# Patient Record
Sex: MALE | Race: WHITE | URBAN - METROPOLITAN AREA
[De-identification: names, ages, dates, MRNs, and addresses within clinical notes are randomized per-mention and may not be internally consistent; named-entity substitution may affect disease eponyms.]

---

## 2020-07-09 ENCOUNTER — APPOINTMENT (OUTPATIENT)
Dept: CT IMAGING | Age: 55
DRG: 066 | End: 2020-07-09
Payer: COMMERCIAL

## 2020-07-09 ENCOUNTER — HOSPITAL ENCOUNTER (OUTPATIENT)
Age: 55
Setting detail: OBSERVATION
Discharge: HOME | DRG: 066 | End: 2020-07-11
Attending: EMERGENCY MEDICINE | Admitting: INTERNAL MEDICINE
Payer: COMMERCIAL

## 2020-07-09 PROBLEM — H53.8 BLURRED VISION: Status: ACTIVE | Noted: 2020-07-09

## 2020-07-09 LAB
ALBUMIN SERPL-MCNC: 5.1 G/DL (ref 3.5–5.2)
ALP BLD-CCNC: 55 U/L (ref 40–129)
ALT SERPL-CCNC: 182 U/L (ref 0–40)
ANION GAP SERPL CALCULATED.3IONS-SCNC: 12 MMOL/L (ref 7–16)
APTT: 31.6 SEC (ref 24.5–35.1)
AST SERPL-CCNC: 106 U/L (ref 0–39)
BASOPHILS ABSOLUTE: 0.02 E9/L (ref 0–0.2)
BASOPHILS RELATIVE PERCENT: 0.4 % (ref 0–2)
BILIRUB SERPL-MCNC: 1.8 MG/DL (ref 0–1.2)
BUN BLDV-MCNC: 12 MG/DL (ref 6–20)
CALCIUM SERPL-MCNC: 9.7 MG/DL (ref 8.6–10.2)
CHLORIDE BLD-SCNC: 98 MMOL/L (ref 98–107)
CHP ED QC CHECK: NORMAL
CO2: 27 MMOL/L (ref 22–29)
CREAT SERPL-MCNC: 0.8 MG/DL (ref 0.7–1.2)
EOSINOPHILS ABSOLUTE: 0.08 E9/L (ref 0.05–0.5)
EOSINOPHILS RELATIVE PERCENT: 1.4 % (ref 0–6)
GFR AFRICAN AMERICAN: >60
GFR NON-AFRICAN AMERICAN: >60 ML/MIN/1.73
GLUCOSE BLD-MCNC: 135 MG/DL (ref 74–99)
GLUCOSE BLD-MCNC: 153 MG/DL
HCT VFR BLD CALC: 43.7 % (ref 37–54)
HEMOGLOBIN: 15.3 G/DL (ref 12.5–16.5)
IMMATURE GRANULOCYTES #: 0.02 E9/L
IMMATURE GRANULOCYTES %: 0.4 % (ref 0–5)
INR BLD: 1.1
LYMPHOCYTES ABSOLUTE: 1.38 E9/L (ref 1.5–4)
LYMPHOCYTES RELATIVE PERCENT: 24.7 % (ref 20–42)
MCH RBC QN AUTO: 31.4 PG (ref 26–35)
MCHC RBC AUTO-ENTMCNC: 35 % (ref 32–34.5)
MCV RBC AUTO: 89.5 FL (ref 80–99.9)
METER GLUCOSE: 153 MG/DL (ref 74–99)
MONOCYTES ABSOLUTE: 0.26 E9/L (ref 0.1–0.95)
MONOCYTES RELATIVE PERCENT: 4.7 % (ref 2–12)
NEUTROPHILS ABSOLUTE: 3.83 E9/L (ref 1.8–7.3)
NEUTROPHILS RELATIVE PERCENT: 68.4 % (ref 43–80)
PDW BLD-RTO: 12.3 FL (ref 11.5–15)
PLATELET # BLD: 123 E9/L (ref 130–450)
PMV BLD AUTO: 10.3 FL (ref 7–12)
POTASSIUM SERPL-SCNC: 4.1 MMOL/L (ref 3.5–5)
PROTHROMBIN TIME: 11.9 SEC (ref 9.3–12.4)
RBC # BLD: 4.88 E12/L (ref 3.8–5.8)
SODIUM BLD-SCNC: 137 MMOL/L (ref 132–146)
TOTAL PROTEIN: 7.8 G/DL (ref 6.4–8.3)
TROPONIN: <0.01 NG/ML (ref 0–0.03)
WBC # BLD: 5.6 E9/L (ref 4.5–11.5)

## 2020-07-09 PROCEDURE — 70450 CT HEAD/BRAIN W/O DYE: CPT

## 2020-07-09 PROCEDURE — G0378 HOSPITAL OBSERVATION PER HR: HCPCS

## 2020-07-09 PROCEDURE — 85730 THROMBOPLASTIN TIME PARTIAL: CPT

## 2020-07-09 PROCEDURE — 6370000000 HC RX 637 (ALT 250 FOR IP): Performed by: INTERNAL MEDICINE

## 2020-07-09 PROCEDURE — 82962 GLUCOSE BLOOD TEST: CPT

## 2020-07-09 PROCEDURE — 84484 ASSAY OF TROPONIN QUANT: CPT

## 2020-07-09 PROCEDURE — 93005 ELECTROCARDIOGRAM TRACING: CPT | Performed by: EMERGENCY MEDICINE

## 2020-07-09 PROCEDURE — 85025 COMPLETE CBC W/AUTO DIFF WBC: CPT

## 2020-07-09 PROCEDURE — 85610 PROTHROMBIN TIME: CPT

## 2020-07-09 PROCEDURE — 80053 COMPREHEN METABOLIC PANEL: CPT

## 2020-07-09 PROCEDURE — 99285 EMERGENCY DEPT VISIT HI MDM: CPT

## 2020-07-09 RX ORDER — ACETAMINOPHEN 325 MG/1
650 TABLET ORAL EVERY 8 HOURS PRN
Status: DISCONTINUED | OUTPATIENT
Start: 2020-07-09 | End: 2020-07-11 | Stop reason: HOSPADM

## 2020-07-09 RX ADMIN — ACETAMINOPHEN 650 MG: 325 TABLET ORAL at 23:08

## 2020-07-09 ASSESSMENT — PAIN DESCRIPTION - ONSET
ONSET: ON-GOING

## 2020-07-09 ASSESSMENT — PAIN DESCRIPTION - PAIN TYPE
TYPE: ACUTE PAIN

## 2020-07-09 ASSESSMENT — PAIN DESCRIPTION - ORIENTATION
ORIENTATION: RIGHT

## 2020-07-09 ASSESSMENT — PAIN DESCRIPTION - LOCATION
LOCATION: EYE
LOCATION: EYE;HEAD

## 2020-07-09 ASSESSMENT — PAIN SCALES - GENERAL
PAINLEVEL_OUTOF10: 3

## 2020-07-09 ASSESSMENT — PAIN DESCRIPTION - DESCRIPTORS
DESCRIPTORS: ACHING;DULL
DESCRIPTORS: HEADACHE;DULL;ACHING

## 2020-07-09 ASSESSMENT — PAIN DESCRIPTION - FREQUENCY
FREQUENCY: CONTINUOUS
FREQUENCY: CONTINUOUS

## 2020-07-10 ENCOUNTER — APPOINTMENT (OUTPATIENT)
Dept: GENERAL RADIOLOGY | Age: 55
DRG: 066 | End: 2020-07-10
Payer: COMMERCIAL

## 2020-07-10 ENCOUNTER — APPOINTMENT (OUTPATIENT)
Dept: MRI IMAGING | Age: 55
DRG: 066 | End: 2020-07-10
Payer: COMMERCIAL

## 2020-07-10 ENCOUNTER — APPOINTMENT (OUTPATIENT)
Dept: CT IMAGING | Age: 55
DRG: 066 | End: 2020-07-10
Payer: COMMERCIAL

## 2020-07-10 PROBLEM — H53.2 DIPLOPIA: Status: ACTIVE | Noted: 2020-07-09

## 2020-07-10 PROBLEM — I63.9 BRAINSTEM STROKE (HCC): Status: ACTIVE | Noted: 2020-07-10

## 2020-07-10 PROBLEM — I63.9 STROKE DETERMINED BY CLINICAL ASSESSMENT (HCC): Status: ACTIVE | Noted: 2020-07-10

## 2020-07-10 LAB
ALBUMIN SERPL-MCNC: 4.1 G/DL (ref 3.5–5.2)
ALP BLD-CCNC: 47 U/L (ref 40–129)
ALT SERPL-CCNC: 154 U/L (ref 0–40)
ANION GAP SERPL CALCULATED.3IONS-SCNC: 15 MMOL/L (ref 7–16)
AST SERPL-CCNC: 81 U/L (ref 0–39)
BILIRUB SERPL-MCNC: 2.1 MG/DL (ref 0–1.2)
BUN BLDV-MCNC: 13 MG/DL (ref 6–20)
CALCIUM SERPL-MCNC: 9 MG/DL (ref 8.6–10.2)
CHLORIDE BLD-SCNC: 100 MMOL/L (ref 98–107)
CHOLESTEROL, TOTAL: 180 MG/DL (ref 0–199)
CO2: 23 MMOL/L (ref 22–29)
CREAT SERPL-MCNC: 0.7 MG/DL (ref 0.7–1.2)
EKG ATRIAL RATE: 63 BPM
EKG P AXIS: 17 DEGREES
EKG P-R INTERVAL: 214 MS
EKG Q-T INTERVAL: 420 MS
EKG QRS DURATION: 94 MS
EKG QTC CALCULATION (BAZETT): 429 MS
EKG R AXIS: 1 DEGREES
EKG T AXIS: 7 DEGREES
EKG VENTRICULAR RATE: 63 BPM
GFR AFRICAN AMERICAN: >60
GFR NON-AFRICAN AMERICAN: >60 ML/MIN/1.73
GLUCOSE BLD-MCNC: 163 MG/DL (ref 74–99)
HCT VFR BLD CALC: 40.6 % (ref 37–54)
HDLC SERPL-MCNC: 22 MG/DL
HEMOGLOBIN: 14.5 G/DL (ref 12.5–16.5)
LDL CHOLESTEROL CALCULATED: 89 MG/DL (ref 0–99)
MCH RBC QN AUTO: 32 PG (ref 26–35)
MCHC RBC AUTO-ENTMCNC: 35.7 % (ref 32–34.5)
MCV RBC AUTO: 89.6 FL (ref 80–99.9)
PDW BLD-RTO: 12.4 FL (ref 11.5–15)
PLATELET # BLD: 114 E9/L (ref 130–450)
PMV BLD AUTO: 10.7 FL (ref 7–12)
POTASSIUM SERPL-SCNC: 3.3 MMOL/L (ref 3.5–5)
RBC # BLD: 4.53 E12/L (ref 3.8–5.8)
SODIUM BLD-SCNC: 138 MMOL/L (ref 132–146)
TOTAL PROTEIN: 6.4 G/DL (ref 6.4–8.3)
TRIGL SERPL-MCNC: 347 MG/DL (ref 0–149)
VLDLC SERPL CALC-MCNC: 69 MG/DL
WBC # BLD: 5.5 E9/L (ref 4.5–11.5)

## 2020-07-10 PROCEDURE — 80061 LIPID PANEL: CPT

## 2020-07-10 PROCEDURE — 6360000004 HC RX CONTRAST MEDICATION: Performed by: RADIOLOGY

## 2020-07-10 PROCEDURE — 70551 MRI BRAIN STEM W/O DYE: CPT

## 2020-07-10 PROCEDURE — 6370000000 HC RX 637 (ALT 250 FOR IP): Performed by: INTERNAL MEDICINE

## 2020-07-10 PROCEDURE — 1200000000 HC SEMI PRIVATE

## 2020-07-10 PROCEDURE — 80053 COMPREHEN METABOLIC PANEL: CPT

## 2020-07-10 PROCEDURE — 6370000000 HC RX 637 (ALT 250 FOR IP): Performed by: PSYCHIATRY & NEUROLOGY

## 2020-07-10 PROCEDURE — 93010 ELECTROCARDIOGRAM REPORT: CPT | Performed by: INTERNAL MEDICINE

## 2020-07-10 PROCEDURE — 36415 COLL VENOUS BLD VENIPUNCTURE: CPT

## 2020-07-10 PROCEDURE — G0378 HOSPITAL OBSERVATION PER HR: HCPCS

## 2020-07-10 PROCEDURE — 70498 CT ANGIOGRAPHY NECK: CPT

## 2020-07-10 PROCEDURE — 97161 PT EVAL LOW COMPLEX 20 MIN: CPT

## 2020-07-10 PROCEDURE — 70496 CT ANGIOGRAPHY HEAD: CPT

## 2020-07-10 PROCEDURE — 85027 COMPLETE CBC AUTOMATED: CPT

## 2020-07-10 PROCEDURE — 70030 X-RAY EYE FOR FOREIGN BODY: CPT

## 2020-07-10 PROCEDURE — 97165 OT EVAL LOW COMPLEX 30 MIN: CPT

## 2020-07-10 RX ORDER — SODIUM CHLORIDE 0.9 % (FLUSH) 0.9 %
10 SYRINGE (ML) INJECTION PRN
Status: DISCONTINUED | OUTPATIENT
Start: 2020-07-10 | End: 2020-07-11 | Stop reason: HOSPADM

## 2020-07-10 RX ORDER — ATORVASTATIN CALCIUM 80 MG/1
80 TABLET, FILM COATED ORAL NIGHTLY
Status: DISCONTINUED | OUTPATIENT
Start: 2020-07-10 | End: 2020-07-11 | Stop reason: HOSPADM

## 2020-07-10 RX ORDER — POTASSIUM CHLORIDE 20 MEQ/1
40 TABLET, EXTENDED RELEASE ORAL ONCE
Status: COMPLETED | OUTPATIENT
Start: 2020-07-10 | End: 2020-07-10

## 2020-07-10 RX ORDER — ASPIRIN 81 MG/1
162 TABLET ORAL DAILY
Status: DISCONTINUED | OUTPATIENT
Start: 2020-07-10 | End: 2020-07-11 | Stop reason: HOSPADM

## 2020-07-10 RX ADMIN — ACETAMINOPHEN 650 MG: 325 TABLET ORAL at 18:43

## 2020-07-10 RX ADMIN — POTASSIUM CHLORIDE 40 MEQ: 1500 TABLET, EXTENDED RELEASE ORAL at 08:38

## 2020-07-10 RX ADMIN — ASPIRIN 162 MG: 81 TABLET ORAL at 09:18

## 2020-07-10 RX ADMIN — ACETAMINOPHEN 650 MG: 325 TABLET ORAL at 07:12

## 2020-07-10 RX ADMIN — ATORVASTATIN CALCIUM 80 MG: 80 TABLET, FILM COATED ORAL at 21:52

## 2020-07-10 RX ADMIN — IOPAMIDOL 60 ML: 755 INJECTION, SOLUTION INTRAVENOUS at 16:13

## 2020-07-10 ASSESSMENT — PAIN DESCRIPTION - PAIN TYPE
TYPE: ACUTE PAIN

## 2020-07-10 ASSESSMENT — PAIN SCALES - GENERAL
PAINLEVEL_OUTOF10: 4
PAINLEVEL_OUTOF10: 6
PAINLEVEL_OUTOF10: 2
PAINLEVEL_OUTOF10: 2
PAINLEVEL_OUTOF10: 4

## 2020-07-10 ASSESSMENT — PAIN DESCRIPTION - ONSET
ONSET: ON-GOING
ONSET: ON-GOING

## 2020-07-10 ASSESSMENT — PAIN DESCRIPTION - ORIENTATION
ORIENTATION: RIGHT
ORIENTATION: RIGHT

## 2020-07-10 ASSESSMENT — PAIN DESCRIPTION - DESCRIPTORS
DESCRIPTORS: ACHING;JABBING
DESCRIPTORS: HEADACHE;SHARP

## 2020-07-10 ASSESSMENT — PAIN DESCRIPTION - FREQUENCY
FREQUENCY: INTERMITTENT
FREQUENCY: CONTINUOUS

## 2020-07-10 ASSESSMENT — PAIN DESCRIPTION - LOCATION
LOCATION: HEAD;EYE
LOCATION: HEAD;EYE

## 2020-07-10 NOTE — CARE COORDINATION
Social Work Discharge Planning:  SW met with patient explained transition of care. Patient lives with spouse in a two story home with 2 steps to gain entrance. PTA patient wore a knee brace sometime and a straight cane to assess with ambulation. Patient has a hx with Devora MARKS. Patient's PCP is  and he uses [facility] in [address] [place] [address]. Patient spouse will transport the patient home at discharge. SW following.   Electronically signed by MARSHA Hull on 7/10/2020 at 3:18 PM

## 2020-07-10 NOTE — H&P
History and Physical      CHIEF COMPLAINT:  Right eye pain with double vision       HISTORY OF PRESENT ILLNESS:      The patient is a 60 y.o. male patient of Dr. Cristina Olivarez presents with right eye pain with double vision. Onset 0300 hrs at work as a . Eye pain resolved prior to the ER visit. Eye evaluation by the [place] optometrist revealed no primary eye issue and the patient referred to the hospital ER for further medical  evaluation. He acknowleges a prior history of  migraine cephalgia(4-6 per month) despite ongoing Botox injections q 3 months. Also reports ? History of TIA in the remote past.     CT head:    No significant abnormal findings. Past Medical History:    Past Medical History:   Diagnosis Date    Ernandez's esophagus     Elevated liver enzymes     Fatty liver disease, nonalcoholic     GERD (gastroesophageal reflux disease)     Ischemic colitis (HCC)     Migraine headache        Past Surgical History:    Past Surgical History:   Procedure Laterality Date    COLECTOMY Left        Medications Prior to Admission:    Medications Prior to Admission: SUMAtriptan (IMITREX) 20 MG/ACT nasal spray, 1 spray by Nasal route 2 times daily as needed for Migraine   [DISCONTINUED] HYDROcodone-acetaminophen (NORCO) 5-325 MG per tablet, Take 1 tablet by mouth every 6 hours as needed for Pain . [DISCONTINUED] sucralfate (CARAFATE) 1 GM/10ML suspension, Take 10 mLs by mouth 4 times daily (before meals and nightly)  [DISCONTINUED] metaxalone (SKELAXIN) 800 MG tablet, Take 800 mg by mouth 3 times daily as needed for Pain  [DISCONTINUED] omeprazole (PRILOSEC) 20 MG delayed release capsule, Take 20 mg by mouth daily as needed    Allergies:    Amoxil [amoxicillin]; Aspartame and phenylalanine; Demerol hcl [meperidine]; and Versed [midazolam]    Social History:    reports that he has never smoked. He has never used smokeless tobacco. He reports current alcohol use.  He reports that he does not use drugs. Family History:   family history is not on file. REVIEW OF SYSTEMS:  As above in the HPI, otherwise negative    PHYSICAL EXAM:    Vitals:  BP (!) 155/80   Pulse 68   Temp 96.4 °F (35.8 °C) (Temporal)   Resp 16   Ht 5' 6.5\" (1.689 m)   Wt 195 lb 4.8 oz (88.6 kg)   SpO2 98%   BMI 31.05 kg/m²     General:   Awake, alert, oriented X 3. Diplopia on left lateral gaze. Vertical skew deviation   HEENT:    Normocephalic, atraumatic. Pupils equal, round, reactive to light. No scleral icterus. No conjunctival injection. Oropharynx clear. No nasal discharge. Neck:       Supple. No bruits, adenopathy, masses, neck vein distention. Trachea in the midline. Heart:      RRR, no murmurs, gallops, rubs  Lungs:     CTA bilaterally, bilat symmetrical expansion, no wheeze, rales, or rhonchi  Abdomen:   Bowel sounds present, soft, nontender, no masses, no organomegaly, no peritoneal signs  Extremities:  No clubbing, cyanosis, or edema  Skin:         Warm and dry, no open lesions or rash  Neuro:     Cranial nerves 2-12 intact, no focal deficits  Rectal:    deferred  Genitalia:  deferred    LABS:    CBC with Differential:    Lab Results   Component Value Date    WBC 5.5 07/10/2020    RBC 4.53 07/10/2020    HGB 14.5 07/10/2020    HCT 40.6 07/10/2020     07/10/2020    MCV 89.6 07/10/2020    MCH 32.0 07/10/2020    MCHC 35.7 07/10/2020    RDW 12.4 07/10/2020    LYMPHOPCT 24.7 07/09/2020    MONOPCT 4.7 07/09/2020    BASOPCT 0.4 07/09/2020    MONOSABS 0.26 07/09/2020    LYMPHSABS 1.38 07/09/2020    EOSABS 0.08 07/09/2020    BASOSABS 0.02 07/09/2020     CMP:    Lab Results   Component Value Date     07/10/2020    K 3.3 07/10/2020     07/10/2020    CO2 23 07/10/2020    BUN 13 07/10/2020    CREATININE 0.7 07/10/2020    GFRAA >60 07/10/2020    LABGLOM >60 07/10/2020    GLUCOSE 163 07/10/2020    PROT 6.4 07/10/2020    LABALBU 4.1 07/10/2020    CALCIUM 9.0 07/10/2020    BILITOT 2.1 07/10/2020    ALKPHOS 47 07/10/2020    AST 81 07/10/2020     07/10/2020       ASSESSMENT:      Active Hospital Problems    Diagnosis Date Noted    Brainstem stroke ([facility] [address] [I63.9] 07/10/2020    Diplopia [H53.2] 07/09/2020    Fatty liver disease, nonalcoholic [C63.4] 58/50/0356    Elevated liver enzymes [R74.8] 11/29/2016    GERD (gastroesophageal reflux disease) [K21.9] 11/29/2016    Ernandez's esophagus [K22.70]         PLAN:  Admit to in patient status               MRI brain, CTA head and neck               Neuro consult         Ambrose Dub 37, DO  9:18 AM  7/10/2020

## 2020-07-10 NOTE — PROGRESS NOTES
I     Functional Mobility Mod I (no AD, to/from bathroom)     Balance Sitting: IND  Standing: Mod I     Activity Tolerance good     Visual/  Perceptual Glasses: yes    pt able to reach clock on wall (while wearing patch), visual tracking appears WFL                Hand dominance: R  UE ROM: RUE:  WFL  LUE:  WFL  Strength: RUE: grossly 5/5 LUE: grossly 5/5   Strength: B WFL  Fine Motor Coordination:  WFL     Hearing: WFL  Sensation:  No c/o numbness/tingling   Tone:  WFL  Edema: none noted                            Comments:Cleared by RN to see pt. Upon arrival, patient supine in bed and agreeable to OT session. At end of session, patient supine in bed with call light and phone within reach, all lines and tubes intact. Pt was given a patch prior to session. Educated pt on wearing schedule of 2 hrs on each eye. Educated to wearuing with functional transfers and ADLs due to report of no diplopia while wearing patch. Pt reports that images appear skewed and vertical when eyepatch is off. Despite visual deficit, Pt completes ADLs/functional mobility/transfers Mod I, demo'ing good balance/safety awareness. Pt appeared to have tolerated session well. Pt demonstrates no OT needs at this time. Eval Complexity: Low    Assessment of current deficits   Functional mobility [x]  ADLs [x] Strength [x]  Cognition []  Functional transfers  [x] IADLs [x] Safety Awareness [x]  Endurance [x]  Fine Motor Coordination [] Balance [x] Vision/perception [] Sensation []   Gross Motor Coordination [] ROM [] Delirium []                  Motor Control []    Patient  instructed on diagnosis, prognosis/goals and plan of care. Demonstrated good understanding. [] Malnutrition indicators have been identified and nursing has been notified to ensure a dietitian consult is ordered.      Evaluation time includes thorough review of current medical information, gathering information on past medical & social history & PLOF, completion of standardized testing, informal observation of tasks, consultation with other medical professions/disciplines, assessment of data & development of POC/goals.      low Evaluation       Time In: 1:15     Time Out: 1:30           Treatment Charges: Mins Units   Ther Ex  [id number]       Manual Therapy [id number]       Thera Activities [id number]       ADL/Home Mgt [id number]     Neuro Re-ed [id number]       Group Therapy        Orthotic manage/training  [id number]       Non-Billable Time       Total Timed Treatment 0 0       Lion Casanova, [facility], OTR/L [id number]

## 2020-07-10 NOTE — ED PROVIDER NOTES
Department of Emergency Medicine   ED  Provider Note  Admit Date/RoomTime: 7/9/2020  5:18 PM  ED Room: [facility]          History of Present Illness:  7/9/20, Time: 8:40 PM EDT  Chief Complaint   Patient presents with    Eye Pain     sent in by [place], seen there today for having sudden onset of righteye pain with double vision while at work at [employer] last night. no other symptoms at this time                Patrica Ramos is a 60 y.o. male presenting to the ED for blurred vision. Patient states he woke up this morning and was seeing double. He had pain behind his right eye when this occurred, this is since resolved. He says he does not wear glasses or contacts. Denies direct injury or trauma. Is never had this before. He was evaluated by his optometrist today, was told his exams were normal, and that this was likely a central process. Instructed to present to the ER. On no anticoagulation. Denies any associated fever, chills, nausea, vomiting, chest pain or shortness of breath, paresthesias, weakness in the extremities, or any other symptoms or complaints. Review of Systems:   Pertinent positives and negatives are stated within HPI, all other systems reviewed and are negative.        --------------------------------------------- PAST HISTORY ---------------------------------------------  Past Medical History:  has a past medical history of Ernandez's esophagus, Elevated liver enzymes, Fatty liver disease, nonalcoholic, GERD (gastroesophageal reflux disease), Ischemic colitis ([facility] [address] and Migraine headache. Past Surgical History:  has a past surgical history that includes colectomy (Left). Social History:  reports that he has never smoked. He has never used smokeless tobacco. He reports that he does not drink alcohol or use drugs. Family History: family history is not on file. . Unless otherwise noted, family history is non contributory    The patients home medications have been reviewed.     Allergies: Amoxil [amoxicillin]; Aspartame and phenylalanine; Demerol hcl [meperidine]; and Versed [midazolam]        ---------------------------------------------------PHYSICAL EXAM--------------------------------------    Constitutional/General: Alert and oriented x3  Head: Normocephalic and atraumatic  Eyes: PERRL, EOMI, sclera non icteric  Mouth: Oropharynx clear, handling secretions, no trismus, no asymmetry of the posterior oropharynx or uvular edema  Neck: Supple, full ROM, no stridor, no meningeal signs  Respiratory: Lungs clear to auscultation bilaterally, no wheezes, rales, or rhonchi. Not in respiratory distress  Cardiovascular:  Regular rate. Regular rhythm. 2+ distal pulses. Equal extremity pulses. Chest: No chest wall tenderness  GI:  Abdomen Soft, Non tender, Non distended. No rebound, guarding, or rigidity. No pulsatile masses. Musculoskeletal: Moves all extremities x 4. Warm and well perfused, no clubbing, cyanosis, or edema. Capillary refill <3 seconds  Integument: skin warm and dry. No rashes. Neurologic: GCS 15, no focal deficits, symmetric strength 5/5 in the upper and lower extremities bilaterally  Psychiatric: Normal Affect          -------------------------------------------------- RESULTS -------------------------------------------------  I have personally reviewed all laboratory and imaging results for this patient. Results are listed below.      LABS: (Lab results interpreted by me)  Results for orders placed or performed during the hospital encounter of 07/09/20   Troponin   Result Value Ref Range    Troponin <0.01 0.00 - 0.03 ng/mL   APTT   Result Value Ref Range    aPTT 31.6 24.5 - 35.1 sec   Protime-INR   Result Value Ref Range    Protime 11.9 9.3 - 12.4 sec    INR 1.1    Comprehensive Metabolic Panel   Result Value Ref Range    Sodium 137 132 - 146 mmol/L    Potassium 4.1 3.5 - 5.0 mmol/L    Chloride 98 98 - 107 mmol/L    CO2 27 22 - 29 mmol/L    Anion Gap 12 7 - 16 mmol/L    Glucose 135 (H) 74 - 99 mg/dL    BUN 12 6 - 20 mg/dL    CREATININE 0.8 0.7 - 1.2 mg/dL    GFR Non-African American >60 >=60 mL/min/1.73    GFR African American >60     Calcium 9.7 8.6 - 10.2 mg/dL    Total Protein 7.8 6.4 - 8.3 g/dL    Alb 5.1 3.5 - 5.2 g/dL    Total Bilirubin 1.8 (H) 0.0 - 1.2 mg/dL    Alkaline Phosphatase 55 40 - 129 U/L     (H) 0 - 40 U/L     (H) 0 - 39 U/L   CBC Auto Differential   Result Value Ref Range    WBC 5.6 4.5 - 11.5 E9/L    RBC 4.88 3.80 - 5.80 E12/L    Hemoglobin 15.3 12.5 - 16.5 g/dL    Hematocrit 43.7 37.0 - 54.0 %    MCV 89.5 80.0 - 99.9 fL    MCH 31.4 26.0 - 35.0 pg    MCHC 35.0 (H) 32.0 - 34.5 %    RDW 12.3 11.5 - 15.0 fL    Platelets 454 (L) 841 - 450 E9/L    MPV 10.3 7.0 - 12.0 fL    Neutrophils % 68.4 43.0 - 80.0 %    Immature Granulocytes % 0.4 0.0 - 5.0 %    Lymphocytes % 24.7 20.0 - 42.0 %    Monocytes % 4.7 2.0 - 12.0 %    Eosinophils % 1.4 0.0 - 6.0 %    Basophils % 0.4 0.0 - 2.0 %    Neutrophils Absolute 3.83 1.80 - 7.30 E9/L    Immature Granulocytes # 0.02 E9/L    Lymphocytes Absolute 1.38 (L) 1.50 - 4.00 E9/L    Monocytes Absolute 0.26 0.10 - 0.95 E9/L    Eosinophils Absolute 0.08 0.05 - 0.50 E9/L    Basophils Absolute 0.02 0.00 - 0.20 E9/L   POCT Glucose   Result Value Ref Range    Glucose 153 mg/dL    QC OK? ok    POCT Glucose   Result Value Ref Range    Meter Glucose 153 (H) 74 - 99 mg/dL   EKG 12 Lead   Result Value Ref Range    Ventricular Rate 63 BPM    Atrial Rate 63 BPM    P-R Interval 214 ms    QRS Duration 94 ms    Q-T Interval 420 ms    QTc Calculation (Bazett) 429 ms    P Axis 17 degrees    R Axis 1 degrees    T Axis 7 degrees   ,       RADIOLOGY:  Interpreted by Radiologist unless otherwise specified  CT HEAD WO CONTRAST   Final Result   No significant abnormal findings.             EKG Interpretation  Interpreted by emergency department physician, Dr. Prerna Rojas     Sinus, rate 63, no STEMI        ------------------------- NURSING NOTES AND VITALS REVIEWED ---------------------------   The nursing notes within the ED encounter and vital signs as below have been reviewed by myself  BP (!) 157/82   Pulse 70   Temp 98 °F (36.7 °C) (Oral)   Resp 16   Ht 5' 6.5\" (1.689 m)   Wt 190 lb (86.2 kg)   SpO2 98%   BMI 30.21 kg/m²     Oxygen Saturation Interpretation: Normal    The patients available past medical records and past encounters were reviewed. ------------------------------ ED COURSE/MEDICAL DECISION MAKING----------------------  Medications - No data to display        The cardiac monitor revealed sinus with a heart rate in the 80s as interpreted by me. The cardiac monitor was ordered secondary to the patient's vision changes and to monitor the patient for dysrhythmia. CPT 95414         Medical Decision Making:    Labs and imaging reviewed. Given his findings and the overall presentation, patient will be admitted. Counseling: The emergency provider has spoken with the patient and discussed todays results, in addition to providing specific details for the plan of care and counseling regarding the diagnosis and prognosis. Questions are answered at this time and they are agreeable with the plan.       --------------------------------- IMPRESSION AND DISPOSITION ---------------------------------    IMPRESSION  1. Blurred vision        DISPOSITION  Disposition: Admit to telemetry  Patient condition is stable        NOTE: This report was transcribed using voice recognition software.  Every effort was made to ensure accuracy; however, inadvertent computerized transcription errors may be present        Natty Saravia MD  07/10/20 [id number]

## 2020-07-10 NOTE — CONSULTS
History Of Present Illness: Víctor Johnson is a 60 y.o. male presenting to the ED for blurred vision. Patient states he woke up this morning and was seeing double. He had pain behind his right eye when this occurred, this is since resolved. He says he does not wear glasses or contacts. Denies direct injury or trauma. Is never had this before. He was evaluated by his optometrist today, was told his exams were normal, and that this was likely a central process. Instructed to present to the ER. On no anticoagulation. Denies any associated fever, chills, nausea, vomiting, chest pain or shortness of breath, paresthesias, weakness in the extremities, or any other symptoms or complaints. As above per ED staff. Patient is interviewed and reports onset of above problem while working as a  prompting him to go to the [employer] [facility] [employer] to see a eye doctor who in turn sent him here. He reports longstanding history of migraine where he often gets pain near his right eye but this pain by the right eye is not like migraine pain. The patient is a 60 y.o. male with significant past medical history of fatty liver with mild thrombocytopenia who presents with above. The patient has the following symptoms:    Change in level of consciousness: alert    New Weakness: no    Numbness or Tingling: no    Difficulty Swallowing: no    Current Medications:   Scheduled Meds:   potassium chloride  40 mEq Oral Once     Continuous Infusions:  PRN Meds:acetaminophen    Allergies:  Amoxil [amoxicillin]; Aspartame and phenylalanine; Demerol hcl [meperidine]; and Versed [midazolam]    Social History:   TOBACCO:   reports that he has never smoked. He has never used smokeless tobacco.  ETOH:   reports current alcohol use.     Past Medical History:        Diagnosis Date    Ernandez's esophagus     Elevated liver enzymes     Fatty liver disease, nonalcoholic     GERD (gastroesophageal reflux disease)     Ischemic colitis ([facility] [address]     Migraine headache        Past Surgical History:        Procedure Laterality Date    COLECTOMY Left          Outside reports reviewed: ER records, historical medical records, lab reports and radiology reports. Patient's medications, allergies, past medical, surgical, social and family histories were reviewed and updated as appropriate. Review of Systems  A comprehensive review of systems was negative except for:       Objective:     Neuro exam 136/78, pulse 62, he is afebrile  General: normal orientation and alertness. Cranial nerve testing was normal.  Except vertical skew deviation noted  Funduscopic eye exam revealed not testable. Motor exam: normal strength and muscle mass. Deep tendon reflexes were 1+ bilaterally. Plantar responses were flexor bilaterally. Cerebellar exam noted finger to nose without dysmetria. Sensation was normal to joint position sense, light touch and a pin prick . Melania Vu Assessment:   Probable brainstem stroke in a patient with dyslipidemia and borderline hypertension with history of fatty liver and mild thrombocytopenia      Plan:   Start statin and low-dose aspirin. MRI of brain pending. CT angiogram of head and neck. Outpatient follow-up with neuro-ophthalmology.   Thank you for consultation

## 2020-07-11 VITALS
RESPIRATION RATE: 16 BRPM | HEIGHT: 67 IN | WEIGHT: 195.3 LBS | BODY MASS INDEX: 30.65 KG/M2 | SYSTOLIC BLOOD PRESSURE: 145 MMHG | TEMPERATURE: 96.5 F | DIASTOLIC BLOOD PRESSURE: 81 MMHG | HEART RATE: 63 BPM | OXYGEN SATURATION: 99 %

## 2020-07-11 PROBLEM — G43.109 OCULAR MIGRAINE: Status: ACTIVE | Noted: 2020-07-11

## 2020-07-11 PROBLEM — E78.9 LIPID DISORDER: Status: ACTIVE | Noted: 2020-07-11

## 2020-07-11 LAB — HBA1C MFR BLD: 6.3 % (ref 4–5.6)

## 2020-07-11 PROCEDURE — 6370000000 HC RX 637 (ALT 250 FOR IP): Performed by: PSYCHIATRY & NEUROLOGY

## 2020-07-11 PROCEDURE — 36415 COLL VENOUS BLD VENIPUNCTURE: CPT

## 2020-07-11 PROCEDURE — 6370000000 HC RX 637 (ALT 250 FOR IP): Performed by: INTERNAL MEDICINE

## 2020-07-11 PROCEDURE — G0378 HOSPITAL OBSERVATION PER HR: HCPCS

## 2020-07-11 PROCEDURE — 83036 HEMOGLOBIN GLYCOSYLATED A1C: CPT

## 2020-07-11 RX ORDER — SUMATRIPTAN 6 MG/.5ML
6 INJECTION, SOLUTION SUBCUTANEOUS ONCE
Status: COMPLETED | OUTPATIENT
Start: 2020-07-11 | End: 2020-07-11

## 2020-07-11 RX ORDER — POTASSIUM CHLORIDE 20 MEQ/1
40 TABLET, EXTENDED RELEASE ORAL ONCE
Status: DISCONTINUED | OUTPATIENT
Start: 2020-07-11 | End: 2020-07-11 | Stop reason: HOSPADM

## 2020-07-11 RX ORDER — ATORVASTATIN CALCIUM 80 MG/1
80 TABLET, FILM COATED ORAL NIGHTLY
Qty: 30 TABLET | Refills: 0 | Status: SHIPPED | OUTPATIENT
Start: 2020-07-11

## 2020-07-11 RX ORDER — ASPIRIN 81 MG/1
162 TABLET ORAL DAILY
Qty: 30 TABLET | Refills: 0 | Status: SHIPPED | OUTPATIENT
Start: 2020-07-12

## 2020-07-11 RX ADMIN — SUMATRIPTAN 6 MG: 6 INJECTION SUBCUTANEOUS at 07:53

## 2020-07-11 RX ADMIN — ACETAMINOPHEN 650 MG: 325 TABLET ORAL at 02:50

## 2020-07-11 RX ADMIN — ASPIRIN 162 MG: 81 TABLET ORAL at 08:48

## 2020-07-11 ASSESSMENT — PAIN SCALES - GENERAL: PAINLEVEL_OUTOF10: 4

## 2020-07-11 NOTE — DISCHARGE SUMMARY
BUN 13 07/10/2020    CREATININE 0.7 07/10/2020    GFRAA >60 07/10/2020    LABGLOM >60 07/10/2020    GLUCOSE 163 07/10/2020    PROT 6.4 07/10/2020    LABALBU 4.1 07/10/2020    CALCIUM 9.0 07/10/2020    BILITOT 2.1 07/10/2020    ALKPHOS 47 07/10/2020    AST 81 07/10/2020     07/10/2020     BMP:    Lab Results   Component Value Date     07/10/2020    K 3.3 07/10/2020     07/10/2020    CO2 23 07/10/2020    BUN 13 07/10/2020    LABALBU 4.1 07/10/2020    CREATININE 0.7 07/10/2020    CALCIUM 9.0 07/10/2020    GFRAA >60 07/10/2020    LABGLOM >60 07/10/2020    GLUCOSE 163 07/10/2020       Disposition: home    Patient Instructions:     Medication List      START taking these medications    aspirin 81 MG EC tablet  Take 2 tablets by mouth daily  Start taking on:  July 12, 2020     atorvastatin 80 MG tablet  Commonly known as:  LIPITOR  Take 1 tablet by mouth nightly        CONTINUE taking these medications    Imitrex 20 MG/ACT nasal spray  Generic drug:  SUMAtriptan           Where to Get Your Medications      These medications were sent to [facility]/pharmacy #[id number] [address] [facility] [place] [phone] [facility] [phone]  [address] [place] [address]., [place] [address]    Phone:  [phone]   · aspirin 81 MG EC tablet  · atorvastatin 80 MG tablet          Activity: activity as tolerated   Diet: low fat, low cholesterol diet    Follow-up with Dr. Jacqueline Mattson in 2 days.     Note that over 30 minutes was spent in preparing discharge papers, discussing discharge with patient, medication review, etc.    Signed:  Jermaine Kay DO  7/11/2020  9:42 AM

## 2020-07-11 NOTE — PROGRESS NOTES
worker Comparison: None. Findings: No radiopaque foreign body is identified    No radiopaque foreign body is identified    Cta Head W Contrast    Result Date: 7/10/2020  Clinical indications: evaluate aneurysm  evaluate aneurysm Exposure control: This examination and all examinations utilizing ionizing radiation at this facility done so according to the ALARA (as low as reasonably achievable) principal for radiation dose reduction. TECHNIQUE: Axial, sagittal, and coronal computed tomography of the head and neck was performed following intravenous administration of 100 mL of Isovue-370. 3-D post processing. Three-dimensional reconstructions of the arterial tree. MIP imaging. FINDINGS: The brain parenchyma is unremarkable. There is no evidence for mass or lymphadenopathy within the neck. There is mild mucosal thickening within the paranasal sinuses but no fluid levels are evident. There are degenerative changes of the cervical spine. There is no evidence for aneurysm or stenosis within the distal internal carotid, distal vertebral, basilar, anterior, middle, and posterior cerebral arteries as well as the respective visualized branches. Lung apices are clear with mild dependent atelectasis. There is no evidence for stenosis or aneurysm within the aortic arch, the innominate artery, subclavian arteries, the common carotid arteries, vertebral arteries, the internal carotid arteries, the external carotid arteries or their respective visualized branches. Essentially negative CTA of the head and neck. Mri Brain Wo Contrast    Result Date: 7/10/2020  Clinical indications: Double vision. COMPARISON: CT brain July 10, 2020. TECHNIQUE: Multiplanar multisequence MRI of the brain was performed without contrast. FINDINGS: There is no abnormal increased signal intensity on the diffusion weighted sequence to suggest acute ischemic event.  The brain parenchyma, CSF spaces, paranasal sinuses and mastoid air cells and surrounding

## 2020-07-11 NOTE — PROGRESS NOTES
Department of Neurological Sciences  Section of General Neurology    Progress Note      SUBJECTIVE:  Complains of moderately severe right eye pain and persisting vertical diplopia. OBJECTIVE:      PHYSICAL EXAM:    Yue Harper is alert, oriented, language intact. Objective:      Neuro exam 116/78, pulse 62, he is afebrile  General: normal orientation and alertness. Cranial nerve testing was normal.  Except vertical skew deviation noted  Funduscopic eye exam revealed not testable. Motor exam: normal strength and muscle mass. Deep tendon reflexes were 1+ bilaterally. Plantar responses were flexor bilaterally. Cerebellar exam noted finger to nose without dysmetria. Sensation was normal to joint position sense, light touch and a pin prick . .        Assessment:   Probable brainstem stroke in a patient with dyslipidemia and borderline hypertension with history of fatty liver and mild thrombocytopenia. History of migraine usually responsive to imitrex      DATA    MRI of brain and CTA head and neck negative       PLAN    Trial of imitrex  Needs out patient evaluation by [facility]  Sign off.  Thanks for consultation

## 2023-10-05 ENCOUNTER — HOSPITAL ENCOUNTER (EMERGENCY)
Age: 58
Discharge: HOME | End: 2023-10-05
Payer: COMMERCIAL

## 2023-10-05 ENCOUNTER — APPOINTMENT (OUTPATIENT)
Dept: GENERAL RADIOLOGY | Age: 58
End: 2023-10-05
Payer: COMMERCIAL

## 2023-10-05 VITALS
SYSTOLIC BLOOD PRESSURE: 168 MMHG | HEIGHT: 67 IN | WEIGHT: 183 LBS | BODY MASS INDEX: 28.72 KG/M2 | OXYGEN SATURATION: 98 % | TEMPERATURE: 98.5 F | DIASTOLIC BLOOD PRESSURE: 87 MMHG | RESPIRATION RATE: 18 BRPM | HEART RATE: 80 BPM

## 2023-10-05 DIAGNOSIS — Z23 NEED FOR TETANUS BOOSTER: ICD-10-CM

## 2023-10-05 DIAGNOSIS — S61.315A LACERATION OF LEFT RING FINGER WITHOUT FOREIGN BODY WITH DAMAGE TO NAIL, INITIAL ENCOUNTER: ICD-10-CM

## 2023-10-05 DIAGNOSIS — S61.313A LACERATION OF LEFT MIDDLE FINGER WITHOUT FOREIGN BODY WITH DAMAGE TO NAIL, INITIAL ENCOUNTER: Primary | ICD-10-CM

## 2023-10-05 PROCEDURE — 2580000003 HC RX 258: Performed by: NURSE PRACTITIONER

## 2023-10-05 PROCEDURE — 99284 EMERGENCY DEPT VISIT MOD MDM: CPT

## 2023-10-05 PROCEDURE — 6360000002 HC RX W HCPCS: Performed by: NURSE PRACTITIONER

## 2023-10-05 PROCEDURE — 12002 RPR S/N/AX/GEN/TRNK2.6-7.5CM: CPT

## 2023-10-05 PROCEDURE — 6370000000 HC RX 637 (ALT 250 FOR IP): Performed by: NURSE PRACTITIONER

## 2023-10-05 PROCEDURE — 73130 X-RAY EXAM OF HAND: CPT

## 2023-10-05 PROCEDURE — 96374 THER/PROPH/DIAG INJ IV PUSH: CPT

## 2023-10-05 PROCEDURE — 90471 IMMUNIZATION ADMIN: CPT | Performed by: NURSE PRACTITIONER

## 2023-10-05 PROCEDURE — 90714 TD VACC NO PRESV 7 YRS+ IM: CPT | Performed by: NURSE PRACTITIONER

## 2023-10-05 RX ORDER — TETANUS AND DIPHTHERIA TOXOIDS ADSORBED 2; 2 [LF]/.5ML; [LF]/.5ML
0.5 INJECTION INTRAMUSCULAR ONCE
Status: COMPLETED | OUTPATIENT
Start: 2023-10-05 | End: 2023-10-05

## 2023-10-05 RX ORDER — DOXYCYCLINE HYCLATE 100 MG
100 TABLET ORAL 2 TIMES DAILY
Qty: 20 TABLET | Refills: 0 | Status: SHIPPED | OUTPATIENT
Start: 2023-10-05 | End: 2023-10-15

## 2023-10-05 RX ORDER — NAPROXEN 500 MG/1
500 TABLET ORAL 2 TIMES DAILY PRN
Qty: 14 TABLET | Refills: 0 | Status: SHIPPED | OUTPATIENT
Start: 2023-10-05 | End: 2023-10-12

## 2023-10-05 RX ORDER — IBUPROFEN 800 MG/1
800 TABLET ORAL ONCE
Status: COMPLETED | OUTPATIENT
Start: 2023-10-05 | End: 2023-10-05

## 2023-10-05 RX ORDER — OXYCODONE HYDROCHLORIDE AND ACETAMINOPHEN 5; 325 MG/1; MG/1
1 TABLET ORAL ONCE
Status: COMPLETED | OUTPATIENT
Start: 2023-10-05 | End: 2023-10-05

## 2023-10-05 RX ORDER — LIDOCAINE HYDROCHLORIDE 10 MG/ML
20 INJECTION, SOLUTION INFILTRATION; PERINEURAL ONCE
Status: DISCONTINUED | OUTPATIENT
Start: 2023-10-05 | End: 2023-10-05 | Stop reason: HOSPADM

## 2023-10-05 RX ORDER — OXYCODONE HYDROCHLORIDE AND ACETAMINOPHEN 5; 325 MG/1; MG/1
1 TABLET ORAL EVERY 6 HOURS PRN
Qty: 12 TABLET | Refills: 0 | Status: SHIPPED | OUTPATIENT
Start: 2023-10-05 | End: 2023-10-08

## 2023-10-05 RX ADMIN — WATER 2000 MG: 1 INJECTION INTRAMUSCULAR; INTRAVENOUS; SUBCUTANEOUS at 17:50

## 2023-10-05 RX ADMIN — TETANUS AND DIPHTHERIA TOXOIDS ADSORBED 0.5 ML: 2; 2 INJECTION INTRAMUSCULAR at 17:45

## 2023-10-05 RX ADMIN — OXYCODONE AND ACETAMINOPHEN 1 TABLET: 5; 325 TABLET ORAL at 17:37

## 2023-10-05 RX ADMIN — IBUPROFEN 800 MG: 800 TABLET, FILM COATED ORAL at 20:51

## 2023-10-05 ASSESSMENT — PAIN DESCRIPTION - LOCATION
LOCATION: FINGER (COMMENT WHICH ONE)
LOCATION: HAND;FINGER (COMMENT WHICH ONE)

## 2023-10-05 ASSESSMENT — PAIN SCALES - GENERAL
PAINLEVEL_OUTOF10: 4
PAINLEVEL_OUTOF10: 6

## 2023-10-05 ASSESSMENT — PAIN DESCRIPTION - DESCRIPTORS
DESCRIPTORS: ACHING;SHOOTING;DISCOMFORT
DESCRIPTORS: BURNING;THROBBING

## 2023-10-05 ASSESSMENT — PAIN DESCRIPTION - ORIENTATION: ORIENTATION: LEFT

## 2023-10-05 ASSESSMENT — PAIN - FUNCTIONAL ASSESSMENT: PAIN_FUNCTIONAL_ASSESSMENT: 0-10

## 2023-10-05 NOTE — ED PROVIDER NOTES
Independent [facility]ONDR[facility] Visit. [facility]  Department of Emergency Medicine   ED  Encounter Note  Admit Date/RoomTime: 10/5/2023  5:18 PM  ED Room:     NAME: Waldo Billings  : 1965  MRN: [id number]     Chief Complaint:  Laceration (Left 3rd/4th finger laceration from table saw. Unknown last tetanus. Bleeding controlled.)    History of Present Illness       Waldo Billings is a 59 y.o. old male presenting to the emergency department by private vehicle with spouse for a laceration to the left 3rd and 4th  distal finger, caused by a table saw, which occurred at work approximately an hour prior to arrival.  He reports he went to urgent care prior to coming here and they told him he could not repair the laceration. There is not a possibility of retained foreign body in the affected area. Bleeding is  controlled with pressure dressing. He takes no blood thinning agents. There is burning pain at injury site 4/10. He is right hand dominant. Tetanus Status:  unknown. He denies any numbness, tingling or any decrease in movement of the fingers. 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 **Informed Consent**    The patient has given verbal consent to have photos taken of left hand and electronically inserted into their ED Provider Note as part of their permanent medical record for purposes of illustration, documentation, treatment management and/or medical review. All Images taken on 10/5/23 of patient name: Waldo Billings were taken by a [facility] [place] approved registered mobile device via [employer] mobile application and transmitted then stored on a secured [address] [place] [address] Site located within [facility]. Left hand                 ROS   Pertinent positives and negatives are stated within HPI, all other systems reviewed and are negative.     Past Medical History:  has a past medical history of Ernandez's esophagus, Elevated liver

## 2023-10-05 NOTE — DISCHARGE INSTRUCTIONS
Elevate hand above the level of the heart as much as possible and do not drink alcohol, drive or operate heavy equipment while taking narcotic medication

## 2025-01-24 ENCOUNTER — TRANSCRIBE ORDERS (OUTPATIENT)
Dept: ADMINISTRATIVE | Age: 60
End: 2025-01-24

## 2025-01-24 DIAGNOSIS — R10.9 ABDOMINAL PAIN, UNSPECIFIED ABDOMINAL LOCATION: Primary | ICD-10-CM

## 2025-02-13 ENCOUNTER — HOSPITAL ENCOUNTER (OUTPATIENT)
Dept: CT IMAGING | Age: 60
Discharge: HOME | End: 2025-02-15
Attending: INTERNAL MEDICINE
Payer: COMMERCIAL

## 2025-02-13 DIAGNOSIS — R10.9 ABDOMINAL PAIN, UNSPECIFIED ABDOMINAL LOCATION: ICD-10-CM

## 2025-02-13 PROCEDURE — 74178 CT ABD&PLV WO CNTR FLWD CNTR: CPT

## 2025-02-13 PROCEDURE — 6360000004 HC RX CONTRAST MEDICATION: Performed by: RADIOLOGY

## 2025-02-13 RX ORDER — IOPAMIDOL 755 MG/ML
75 INJECTION, SOLUTION INTRAVASCULAR
Status: COMPLETED | OUTPATIENT
Start: 2025-02-13 | End: 2025-02-13

## 2025-02-13 RX ADMIN — IOPAMIDOL 75 ML: 755 INJECTION, SOLUTION INTRAVENOUS at 16:26

## 2025-02-24 ENCOUNTER — APPOINTMENT (OUTPATIENT)
Dept: CT IMAGING | Age: 60
End: 2025-02-24
Payer: COMMERCIAL

## 2025-02-24 ENCOUNTER — HOSPITAL ENCOUNTER (EMERGENCY)
Age: 60
Discharge: HOME | End: 2025-02-24
Attending: STUDENT IN AN ORGANIZED HEALTH CARE EDUCATION/TRAINING PROGRAM
Payer: COMMERCIAL

## 2025-02-24 VITALS
HEART RATE: 75 BPM | DIASTOLIC BLOOD PRESSURE: 79 MMHG | WEIGHT: 180 LBS | RESPIRATION RATE: 18 BRPM | SYSTOLIC BLOOD PRESSURE: 118 MMHG | OXYGEN SATURATION: 97 % | BODY MASS INDEX: 28.93 KG/M2 | HEIGHT: 66 IN | TEMPERATURE: 97.2 F

## 2025-02-24 DIAGNOSIS — R10.84 GENERALIZED ABDOMINAL PAIN: ICD-10-CM

## 2025-02-24 DIAGNOSIS — R11.2 NAUSEA AND VOMITING, UNSPECIFIED VOMITING TYPE: ICD-10-CM

## 2025-02-24 DIAGNOSIS — N30.00 ACUTE CYSTITIS WITHOUT HEMATURIA: Primary | ICD-10-CM

## 2025-02-24 LAB
ALBUMIN SERPL-MCNC: 4.6 G/DL (ref 3.5–5.2)
ALP SERPL-CCNC: 52 U/L (ref 40–129)
ALT SERPL-CCNC: 67 U/L (ref 0–40)
ANION GAP SERPL CALCULATED.3IONS-SCNC: 14 MMOL/L (ref 7–16)
AST SERPL-CCNC: 35 U/L (ref 0–39)
ATYPICAL LYMPHOCYTE ABSOLUTE COUNT: 0.06 K/UL (ref 0–0.46)
ATYPICAL LYMPHOCYTES: 1 % (ref 0–4)
BACTERIA URNS QL MICRO: ABNORMAL
BASOPHILS # BLD: 0 K/UL (ref 0–0.2)
BASOPHILS NFR BLD: 0 % (ref 0–2)
BILIRUB SERPL-MCNC: 2.8 MG/DL (ref 0–1.2)
BILIRUB UR QL STRIP: ABNORMAL
BUN SERPL-MCNC: 20 MG/DL (ref 6–23)
CALCIUM SERPL-MCNC: 9.6 MG/DL (ref 8.6–10.2)
CHLORIDE SERPL-SCNC: 97 MMOL/L (ref 98–107)
CLARITY UR: CLEAR
CO2 SERPL-SCNC: 23 MMOL/L (ref 22–29)
COLOR UR: ABNORMAL
CREAT SERPL-MCNC: 1 MG/DL (ref 0.7–1.2)
EKG ATRIAL RATE: 74 BPM
EKG P AXIS: 23 DEGREES
EKG P-R INTERVAL: 206 MS
EKG Q-T INTERVAL: 378 MS
EKG QRS DURATION: 82 MS
EKG QTC CALCULATION (BAZETT): 419 MS
EKG T AXIS: 21 DEGREES
EKG VENTRICULAR RATE: 74 BPM
EOSINOPHIL # BLD: 0 K/UL (ref 0.05–0.5)
EOSINOPHILS RELATIVE PERCENT: 0 % (ref 0–6)
ERYTHROCYTE [DISTWIDTH] IN BLOOD BY AUTOMATED COUNT: 12.8 % (ref 11.5–15)
GFR, ESTIMATED: 90 ML/MIN/1.73M2
GLUCOSE SERPL-MCNC: 141 MG/DL (ref 74–99)
GLUCOSE UR STRIP-MCNC: NEGATIVE MG/DL
HCT VFR BLD AUTO: 42.7 % (ref 37–54)
HGB BLD-MCNC: 15 G/DL (ref 12.5–16.5)
HGB UR QL STRIP.AUTO: NEGATIVE
KETONES UR STRIP-MCNC: ABNORMAL MG/DL
LACTATE BLDV-SCNC: 1.9 MMOL/L (ref 0.5–2.2)
LEUKOCYTE ESTERASE UR QL STRIP: NEGATIVE
LIPASE SERPL-CCNC: 22 U/L (ref 13–60)
LYMPHOCYTES NFR BLD: 0.19 K/UL (ref 1.5–4)
LYMPHOCYTES RELATIVE PERCENT: 3 % (ref 20–42)
MAGNESIUM SERPL-MCNC: 2 MG/DL (ref 1.6–2.6)
MCH RBC QN AUTO: 31.3 PG (ref 26–35)
MCHC RBC AUTO-ENTMCNC: 35.1 G/DL (ref 32–34.5)
MCV RBC AUTO: 89 FL (ref 80–99.9)
MONOCYTES NFR BLD: 0.19 K/UL (ref 0.1–0.95)
MONOCYTES NFR BLD: 3 % (ref 2–12)
NEUTROPHILS NFR BLD: 94 % (ref 43–80)
NEUTS SEG NFR BLD: 6.76 K/UL (ref 1.8–7.3)
NITRITE UR QL STRIP: POSITIVE
PH UR STRIP: 6 [PH] (ref 5–8)
PLATELET, FLUORESCENCE: 117 K/UL (ref 130–450)
PMV BLD AUTO: 10.7 FL (ref 7–12)
POTASSIUM SERPL-SCNC: 4.2 MMOL/L (ref 3.5–5)
PROT SERPL-MCNC: 7.5 G/DL (ref 6.4–8.3)
PROT UR STRIP-MCNC: 30 MG/DL
RBC # BLD AUTO: 4.8 M/UL (ref 3.8–5.8)
RBC # BLD: NORMAL 10*6/UL
RBC #/AREA URNS HPF: ABNORMAL /HPF
SODIUM SERPL-SCNC: 134 MMOL/L (ref 132–146)
SP GR UR STRIP: >1.03 (ref 1–1.03)
TROPONIN I SERPL HS-MCNC: 6 NG/L (ref 0–11)
UROBILINOGEN UR STRIP-ACNC: 0.2 EU/DL (ref 0–1)
WBC #/AREA URNS HPF: ABNORMAL /HPF
WBC OTHER # BLD: 7.2 K/UL (ref 4.5–11.5)

## 2025-02-24 PROCEDURE — 96375 TX/PRO/DX INJ NEW DRUG ADDON: CPT

## 2025-02-24 PROCEDURE — 93005 ELECTROCARDIOGRAM TRACING: CPT

## 2025-02-24 PROCEDURE — 6360000002 HC RX W HCPCS

## 2025-02-24 PROCEDURE — 83690 ASSAY OF LIPASE: CPT

## 2025-02-24 PROCEDURE — 80053 COMPREHEN METABOLIC PANEL: CPT

## 2025-02-24 PROCEDURE — 85025 COMPLETE CBC W/AUTO DIFF WBC: CPT

## 2025-02-24 PROCEDURE — 6360000004 HC RX CONTRAST MEDICATION: Performed by: RADIOLOGY

## 2025-02-24 PROCEDURE — 2580000003 HC RX 258

## 2025-02-24 PROCEDURE — 87086 URINE CULTURE/COLONY COUNT: CPT

## 2025-02-24 PROCEDURE — 81001 URINALYSIS AUTO W/SCOPE: CPT

## 2025-02-24 PROCEDURE — 96361 HYDRATE IV INFUSION ADD-ON: CPT

## 2025-02-24 PROCEDURE — 99285 EMERGENCY DEPT VISIT HI MDM: CPT

## 2025-02-24 PROCEDURE — 96374 THER/PROPH/DIAG INJ IV PUSH: CPT

## 2025-02-24 PROCEDURE — 93010 ELECTROCARDIOGRAM REPORT: CPT | Performed by: INTERNAL MEDICINE

## 2025-02-24 PROCEDURE — 74174 CTA ABD&PLVS W/CONTRAST: CPT

## 2025-02-24 PROCEDURE — 83735 ASSAY OF MAGNESIUM: CPT

## 2025-02-24 PROCEDURE — 84484 ASSAY OF TROPONIN QUANT: CPT

## 2025-02-24 PROCEDURE — 83605 ASSAY OF LACTIC ACID: CPT

## 2025-02-24 RX ORDER — IOPAMIDOL 755 MG/ML
75 INJECTION, SOLUTION INTRAVASCULAR
Status: COMPLETED | OUTPATIENT
Start: 2025-02-24 | End: 2025-02-24

## 2025-02-24 RX ORDER — CEFDINIR 300 MG/1
300 CAPSULE ORAL 2 TIMES DAILY
Qty: 14 CAPSULE | Refills: 0 | Status: SHIPPED | OUTPATIENT
Start: 2025-02-24 | End: 2025-03-03

## 2025-02-24 RX ORDER — ONDANSETRON 2 MG/ML
4 INJECTION INTRAMUSCULAR; INTRAVENOUS ONCE
Status: COMPLETED | OUTPATIENT
Start: 2025-02-24 | End: 2025-02-24

## 2025-02-24 RX ORDER — ONDANSETRON 4 MG/1
4 TABLET, ORALLY DISINTEGRATING ORAL 3 TIMES DAILY PRN
Qty: 21 TABLET | Refills: 0 | Status: SHIPPED | OUTPATIENT
Start: 2025-02-24

## 2025-02-24 RX ORDER — FENTANYL CITRATE 50 UG/ML
25 INJECTION, SOLUTION INTRAMUSCULAR; INTRAVENOUS ONCE
Status: COMPLETED | OUTPATIENT
Start: 2025-02-24 | End: 2025-02-24

## 2025-02-24 RX ORDER — DICYCLOMINE HYDROCHLORIDE 10 MG/1
10 CAPSULE ORAL
Qty: 28 CAPSULE | Refills: 0 | Status: SHIPPED | OUTPATIENT
Start: 2025-02-24 | End: 2025-03-03

## 2025-02-24 RX ORDER — 0.9 % SODIUM CHLORIDE 0.9 %
1000 INTRAVENOUS SOLUTION INTRAVENOUS ONCE
Status: COMPLETED | OUTPATIENT
Start: 2025-02-24 | End: 2025-02-24

## 2025-02-24 RX ADMIN — ONDANSETRON 4 MG: 2 INJECTION, SOLUTION INTRAMUSCULAR; INTRAVENOUS at 07:32

## 2025-02-24 RX ADMIN — SODIUM CHLORIDE 1000 ML: 9 INJECTION, SOLUTION INTRAVENOUS at 07:34

## 2025-02-24 RX ADMIN — IOPAMIDOL 75 ML: 755 INJECTION, SOLUTION INTRAVENOUS at 08:44

## 2025-02-24 RX ADMIN — FENTANYL CITRATE 25 MCG: 50 INJECTION INTRAMUSCULAR; INTRAVENOUS at 07:34

## 2025-02-24 ASSESSMENT — PAIN DESCRIPTION - LOCATION
LOCATION: ABDOMEN
LOCATION: ABDOMEN

## 2025-02-24 ASSESSMENT — PAIN SCALES - GENERAL
PAINLEVEL_OUTOF10: 8
PAINLEVEL_OUTOF10: 8

## 2025-02-24 ASSESSMENT — PAIN DESCRIPTION - DESCRIPTORS: DESCRIPTORS: DISCOMFORT

## 2025-02-24 ASSESSMENT — PAIN - FUNCTIONAL ASSESSMENT: PAIN_FUNCTIONAL_ASSESSMENT: 0-10

## 2025-02-24 NOTE — ED PROVIDER NOTES
COURSE    Disposition Considerations (Tests not ordered but considered, Shared Decision Making, Pt Expectation of Test or Tx.): Spoke with pt regarding plans for testing and tx; he is understanding and agreeable to plan.     Diagnoses considered include (but not limited to): Ischemic colitis vs diverticulitis vs colitis vs dehydration vs UTI vs ACS vs obstruction vs gastroenteritis vs gastritis, etc.     See ED COURSE for additional MDM. Labs & imaging reviewed and interpreted, see RESULTS above.     Re-Evaluations:           See ED Course above.       This patient's ED course included: a personal history and physicial examination, multiple bedside re-evaluations, IV medications, cardiac monitoring, and continuous pulse oximetry    This patient has remained hemodynamically stable during their ED course.      Consultations:  See ED Course    Counseling:   The emergency physician has spoken with the patient and discussed today’s results, in addition to providing specific details for the plan of care and counseling regarding the diagnosis and prognosis.  Questions are answered at this time and they are agreeable with the plan.       --------------------------------- IMPRESSION AND DISPOSITION ---------------------------------    IMPRESSION  1. Acute cystitis without hematuria    2. Generalized abdominal pain    3. Nausea and vomiting, unspecified vomiting type        DISPOSITION  Disposition: Discharge to home  Patient condition is stable    NOTE: This report was transcribed using voice recognition software. Every effort was made to ensure accuracy; however, inadvertent computerized transcription errors may be present.    Also please note that patient was seen and examined by attending physician. Plan of care and disposition discussed with attending physician and they were immediately available or present for all procedures performed.       -- Tiffani Polo D.O. PGY-3     Emergency Medicine      2/24/2025 11:36 AM

## 2025-02-25 LAB
MICROORGANISM SPEC CULT: ABNORMAL
SERVICE CMNT-IMP: ABNORMAL
SPECIMEN DESCRIPTION: ABNORMAL